# Patient Record
Sex: MALE
[De-identification: names, ages, dates, MRNs, and addresses within clinical notes are randomized per-mention and may not be internally consistent; named-entity substitution may affect disease eponyms.]

---

## 2023-07-14 ENCOUNTER — DASHBOARD ENCOUNTERS (OUTPATIENT)
Age: 48
End: 2023-07-14

## 2023-07-14 ENCOUNTER — OFFICE VISIT (OUTPATIENT)
Dept: URBAN - METROPOLITAN AREA CLINIC 107 | Facility: CLINIC | Age: 48
End: 2023-07-14
Payer: COMMERCIAL

## 2023-07-14 VITALS
BODY MASS INDEX: 35.84 KG/M2 | DIASTOLIC BLOOD PRESSURE: 80 MMHG | HEIGHT: 71 IN | HEART RATE: 70 BPM | SYSTOLIC BLOOD PRESSURE: 124 MMHG | RESPIRATION RATE: 18 BRPM | WEIGHT: 256 LBS | TEMPERATURE: 97.1 F

## 2023-07-14 DIAGNOSIS — K57.32 CECAL DIVERTICULITIS: ICD-10-CM

## 2023-07-14 DIAGNOSIS — K43.2 INCISIONAL HERNIA, WITHOUT OBSTRUCTION OR GANGRENE: ICD-10-CM

## 2023-07-14 DIAGNOSIS — Z90.49 H/O COLECTOMY: ICD-10-CM

## 2023-07-14 PROBLEM — 427816007: Status: ACTIVE | Noted: 2023-07-14

## 2023-07-14 PROBLEM — 307496006: Status: ACTIVE | Noted: 2023-07-14

## 2023-07-14 PROCEDURE — 99244 OFF/OP CNSLTJ NEW/EST MOD 40: CPT | Performed by: INTERNAL MEDICINE

## 2023-07-14 PROCEDURE — 99204 OFFICE O/P NEW MOD 45 MIN: CPT | Performed by: INTERNAL MEDICINE

## 2023-07-14 RX ORDER — ONDANSETRON 4 MG/1
TABLET, ORALLY DISINTEGRATING ORAL
Qty: 20 TABLET | Status: ON HOLD | COMMUNITY

## 2023-07-14 RX ORDER — HYDROCODONE BITARTRATE AND ACETAMINOPHEN 5; 325 MG/1; MG/1
TAKE 1 TABLET BY MOUTH EVERY 6 HOURS AS NEEDED TABLET ORAL
Qty: 12 EACH | Refills: 0 | Status: ON HOLD | COMMUNITY

## 2023-07-14 NOTE — HPI-TODAY'S VISIT:
47 y/o male referred by Devorah Alvarado for abdominal bulging. He was seen by Dr. Gonzalez in 2021 and 2022. He had a colon resection in 7/2022 for perforated diverticulitis. He had a colostomy for several months then a reversal in 2022. He was cared for in the past by Dr. Bhatt and last had a CSC in July 2021 which was normal aside from diverticulosis. His abdominal bulge showed up over the past few months.